# Patient Record
Sex: FEMALE | Race: WHITE | URBAN - METROPOLITAN AREA
[De-identification: names, ages, dates, MRNs, and addresses within clinical notes are randomized per-mention and may not be internally consistent; named-entity substitution may affect disease eponyms.]

---

## 2017-12-04 ENCOUNTER — TELEPHONE (OUTPATIENT)
Dept: ORTHOPEDIC SURGERY | Age: 22
End: 2017-12-04

## 2017-12-04 NOTE — TELEPHONE ENCOUNTER
I spoke with the patient's father regarding Hand Surgery referral. I then spoke with [facility] [address] [place] [address] regarding an appointment today. They will be notifying the patient once they schedule an appointment.